# Patient Record
Sex: FEMALE | Race: OTHER | URBAN - METROPOLITAN AREA
[De-identification: names, ages, dates, MRNs, and addresses within clinical notes are randomized per-mention and may not be internally consistent; named-entity substitution may affect disease eponyms.]

---

## 2022-12-12 PROBLEM — N64.4 MASTALGIA: Status: ACTIVE | Noted: 2022-12-12

## 2022-12-12 NOTE — PROGRESS NOTES
Subjective:   Patient ID: Eliazbeth Jason is a 34year old female. Patient here for routine exam.  Reports having right mastitis for about two months with multiple treatments with antibiotics. Patient states this occurred after piercing from nipples removed. No drainage or erythema now but feels that there is discomfort with shooting pains in right breast.  Discussed that after a chronic infection there could be scar tissue under right nipple that will take time to heal.  Discussed going for Bilateral Breast ultrasound for Mastalgia. Patient expresses understanding and agrees. Patient also reports does not use contraception but desires to conceive. Has been with same partner for ten years. Partner has no children from any previous relationship. Patient reports regular menses and no H/O PID. Discussed that partner can check Semen Analysis with his PCP. This is a 30 minute visit and greater than 50% of the time was spent counseling the patient and/or coordinating care. History/Other:   Review of Systems   Constitutional: Negative. HENT: Negative. Respiratory: Negative. Cardiovascular: Negative. Gastrointestinal: Negative. Genitourinary: Negative. Neurological: Negative. Hematological: Negative. Psychiatric/Behavioral: Negative. No current outpatient medications on file. Allergies:No Known Allergies    Objective:   Physical Exam  Vitals and nursing note reviewed. Constitutional:       Appearance: Normal appearance. She is well-developed. She is obese. HENT:      Head: Normocephalic. Neck:      Thyroid: No thyromegaly. Cardiovascular:      Rate and Rhythm: Normal rate and regular rhythm. Heart sounds: Normal heart sounds. No murmur heard. Pulmonary:      Effort: Pulmonary effort is normal.      Breath sounds: Normal breath sounds. Comments: Breast Exam:  No masses. No nipple discharge. No adenopathy. No erythema.   Some scarring under right nipple. Abdominal:      General: Abdomen is flat. Bowel sounds are normal.      Palpations: Abdomen is soft. There is no mass. Tenderness: There is no abdominal tenderness. Genitourinary:     General: Normal vulva. Vagina: Normal. No vaginal discharge. Comments: Cervix:  No CMT. No lesions. Pap Done. Uterus:  Small, NT and AV. Adnexa:  No adnexal masses. NT. Musculoskeletal:         General: Normal range of motion. Cervical back: Normal range of motion and neck supple. Lymphadenopathy:      Cervical: No cervical adenopathy. Skin:     General: Skin is warm and dry. Neurological:      General: No focal deficit present. Mental Status: She is alert and oriented to person, place, and time. Psychiatric:         Mood and Affect: Mood normal.         Behavior: Behavior normal.         Thought Content: Thought content normal.         Judgment: Judgment normal.         Assessment & Plan:   Well woman exam with routine gynecological exam  (primary encounter diagnosis)  Mastalgia  Encounter for gynecological examination without abnormal finding   F/U Labs. Encouraged monthly SBE. Discussed diet and exercise.     Orders Placed This Encounter      ThinPrep PAP with HPV Reflex Request [E]      Meds This Visit:  Requested Prescriptions      No prescriptions requested or ordered in this encounter       Imaging & Referrals:  US BREAST BILATERAL COMPLETE (KQC=[id number])

## 2023-01-06 ENCOUNTER — HOSPITAL ENCOUNTER (OUTPATIENT)
Dept: ULTRASOUND IMAGING | Facility: HOSPITAL | Age: 33
Discharge: HOME | End: 2023-01-06
Attending: OBSTETRICS & GYNECOLOGY
Payer: COMMERCIAL

## 2023-01-06 ENCOUNTER — HOSPITAL ENCOUNTER (OUTPATIENT)
Dept: MAMMOGRAPHY | Facility: HOSPITAL | Age: 33
Discharge: HOME | End: 2023-01-06
Attending: OBSTETRICS & GYNECOLOGY
Payer: COMMERCIAL

## 2023-01-06 DIAGNOSIS — N64.4 BREAST PAIN: ICD-10-CM

## 2023-01-06 PROCEDURE — 77066 DX MAMMO INCL CAD BI: CPT | Performed by: OBSTETRICS & GYNECOLOGY

## 2023-01-06 PROCEDURE — 76642 ULTRASOUND BREAST LIMITED: CPT | Performed by: OBSTETRICS & GYNECOLOGY

## 2023-01-06 PROCEDURE — 77062 BREAST TOMOSYNTHESIS BI: CPT | Performed by: OBSTETRICS & GYNECOLOGY

## 2023-04-03 ENCOUNTER — OFFICE VISIT (OUTPATIENT)
Dept: OBGYN CLINIC | Facility: CLINIC | Age: 33
End: 2023-04-03

## 2023-04-03 VITALS
WEIGHT: 283 LBS | BODY MASS INDEX: 40.52 KG/M2 | DIASTOLIC BLOOD PRESSURE: 81 MMHG | SYSTOLIC BLOOD PRESSURE: 124 MMHG | HEIGHT: 70 IN

## 2023-04-03 DIAGNOSIS — N92.6 MISSED MENSES: Primary | ICD-10-CM

## 2023-04-03 PROBLEM — O99.211 OBESITY AFFECTING PREGNANCY IN FIRST TRIMESTER: Status: ACTIVE | Noted: 2023-04-03

## 2023-04-03 PROBLEM — O99.211 OBESITY AFFECTING PREGNANCY IN FIRST TRIMESTER (HCC): Status: ACTIVE | Noted: 2023-04-03

## 2023-04-03 LAB
CONTROL LINE PRESENT WITH A CLEAR BACKGROUND (YES/NO): YES YES/NO
PREGNANCY TEST, URINE: POSITIVE

## 2023-04-03 PROCEDURE — 81025 URINE PREGNANCY TEST: CPT | Performed by: OBSTETRICS & GYNECOLOGY

## 2023-04-03 PROCEDURE — 3074F SYST BP LT 130 MM HG: CPT | Performed by: OBSTETRICS & GYNECOLOGY

## 2023-04-03 PROCEDURE — 99213 OFFICE O/P EST LOW 20 MIN: CPT | Performed by: OBSTETRICS & GYNECOLOGY

## 2023-04-03 PROCEDURE — 3079F DIAST BP 80-89 MM HG: CPT | Performed by: OBSTETRICS & GYNECOLOGY

## 2023-04-03 PROCEDURE — 3008F BODY MASS INDEX DOCD: CPT | Performed by: OBSTETRICS & GYNECOLOGY

## 2023-04-03 NOTE — PROGRESS NOTES
Subjective:   Patient ID: Maria Fernanda Serna is a 34year old female. Patient here for PCV. Unsure dates. Will get Quant BHCG today and then schedule OB ultrasound x 2 weeks for Dates and viability. Menses have been irregular. Weight has been fluctuating. Discussed PNC and PNV. Optional and required screening reviewed. Miscarriage Precautions reviewed. All questions answered. This is a 20 minute visit and greater than 50% of the time was spent counseling the patient and/or coordinating care. History/Other:   Review of Systems   Constitutional: Negative. HENT: Negative. Respiratory: Negative. Cardiovascular: Negative. Gastrointestinal: Negative. Genitourinary: Negative. Neurological: Negative. Hematological: Negative. Psychiatric/Behavioral: Negative. No current outpatient medications on file. Allergies:No Known Allergies    Objective:   Physical Exam  Vitals and nursing note reviewed. Constitutional:       Appearance: Normal appearance. Skin:     General: Skin is warm and dry. Neurological:      General: No focal deficit present. Mental Status: She is alert and oriented to person, place, and time. Psychiatric:         Mood and Affect: Mood normal.         Behavior: Behavior normal.         Thought Content: Thought content normal.         Judgment: Judgment normal.         Assessment & Plan:   Missed menses  (primary encounter diagnosis)  All questions answered. F/U Labs and OB ultrasound. Miscarriage Precautions reviewed.    Orders Placed This Encounter      POC Urine pregnancy test [06122]      HCG, Beta Subunit, Qual      Meds This Visit:  Requested Prescriptions      No prescriptions requested or ordered in this encounter       Imaging & Referrals:  US PREG 1ST TRIMESTER (CPT=76801)

## 2023-04-04 ENCOUNTER — TELEPHONE (OUTPATIENT)
Dept: OBGYN CLINIC | Facility: CLINIC | Age: 33
End: 2023-04-04

## 2023-04-04 ENCOUNTER — LAB ENCOUNTER (OUTPATIENT)
Dept: LAB | Age: 33
End: 2023-04-04
Attending: OBSTETRICS & GYNECOLOGY
Payer: COMMERCIAL

## 2023-04-04 DIAGNOSIS — N92.6 MISSED MENSES: Primary | ICD-10-CM

## 2023-04-04 DIAGNOSIS — N92.6 MISSED MENSES: ICD-10-CM

## 2023-04-04 LAB
B-HCG SERPL-ACNC: 62 MIU/ML
HCG SERPL QL: POSITIVE

## 2023-04-04 PROCEDURE — 36415 COLL VENOUS BLD VENIPUNCTURE: CPT

## 2023-04-04 PROCEDURE — 84703 CHORIONIC GONADOTROPIN ASSAY: CPT

## 2023-04-04 PROCEDURE — 84702 CHORIONIC GONADOTROPIN TEST: CPT

## 2023-04-04 NOTE — TELEPHONE ENCOUNTER
Per result note, ML requesting Quant HCG instead of Qualitative HCG, lab was drawn today 4/4/23. Lab called and confirmed that blood in lab can be used for a Quantitative BHCG test. Order placed.

## 2023-04-05 DIAGNOSIS — N92.6 MISSED MENSES: Primary | ICD-10-CM

## 2023-04-06 ENCOUNTER — TELEPHONE (OUTPATIENT)
Dept: OBGYN CLINIC | Facility: CLINIC | Age: 33
End: 2023-04-06

## 2023-04-06 ENCOUNTER — LAB ENCOUNTER (OUTPATIENT)
Dept: LAB | Age: 33
End: 2023-04-06
Attending: FAMILY MEDICINE
Payer: COMMERCIAL

## 2023-04-06 DIAGNOSIS — N92.6 MISSED MENSES: ICD-10-CM

## 2023-04-06 DIAGNOSIS — O03.9 SAB (SPONTANEOUS ABORTION): Primary | ICD-10-CM

## 2023-04-06 LAB — B-HCG SERPL-ACNC: 58 MIU/ML

## 2023-04-06 PROCEDURE — 84702 CHORIONIC GONADOTROPIN TEST: CPT

## 2023-04-06 PROCEDURE — 36415 COLL VENOUS BLD VENIPUNCTURE: CPT

## 2023-04-13 ENCOUNTER — LAB ENCOUNTER (OUTPATIENT)
Dept: LAB | Facility: REFERENCE LAB | Age: 33
End: 2023-04-13
Attending: OBSTETRICS & GYNECOLOGY
Payer: COMMERCIAL

## 2023-04-13 ENCOUNTER — TELEPHONE (OUTPATIENT)
Dept: OBGYN CLINIC | Facility: CLINIC | Age: 33
End: 2023-04-13

## 2023-04-13 DIAGNOSIS — O03.9 SAB (SPONTANEOUS ABORTION): ICD-10-CM

## 2023-04-13 LAB
B-HCG SERPL-ACNC: 46 MIU/ML
RH BLOOD TYPE: POSITIVE

## 2023-04-13 PROCEDURE — 84702 CHORIONIC GONADOTROPIN TEST: CPT

## 2023-04-13 PROCEDURE — 86901 BLOOD TYPING SEROLOGIC RH(D): CPT

## 2023-04-13 PROCEDURE — 86900 BLOOD TYPING SEROLOGIC ABO: CPT

## 2023-04-13 PROCEDURE — 36415 COLL VENOUS BLD VENIPUNCTURE: CPT

## 2023-04-21 ENCOUNTER — HOSPITAL ENCOUNTER (OUTPATIENT)
Dept: ULTRASOUND IMAGING | Age: 33
Discharge: HOME | End: 2023-04-21
Attending: OBSTETRICS & GYNECOLOGY
Payer: COMMERCIAL

## 2023-04-21 DIAGNOSIS — N92.6 MISSED MENSES: ICD-10-CM

## 2023-04-21 PROCEDURE — 76801 OB US < 14 WKS SINGLE FETUS: CPT | Performed by: OBSTETRICS & GYNECOLOGY

## 2023-04-21 PROCEDURE — 76817 TRANSVAGINAL US OBSTETRIC: CPT | Performed by: OBSTETRICS & GYNECOLOGY

## 2024-08-09 ENCOUNTER — TELEPHONE (OUTPATIENT)
Dept: OBGYN CLINIC | Facility: CLINIC | Age: 34
End: 2024-08-09

## 2024-08-15 ENCOUNTER — OFFICE VISIT (OUTPATIENT)
Dept: OBGYN CLINIC | Facility: CLINIC | Age: 34
End: 2024-08-15
Payer: COMMERCIAL

## 2024-08-15 VITALS
HEIGHT: 70 IN | SYSTOLIC BLOOD PRESSURE: 128 MMHG | BODY MASS INDEX: 41.54 KG/M2 | DIASTOLIC BLOOD PRESSURE: 72 MMHG | WEIGHT: 290.19 LBS

## 2024-08-15 DIAGNOSIS — E28.2 PCOS (POLYCYSTIC OVARIAN SYNDROME): ICD-10-CM

## 2024-08-15 DIAGNOSIS — Z01.419 ENCOUNTER FOR ANNUAL ROUTINE GYNECOLOGICAL EXAMINATION: Primary | ICD-10-CM

## 2024-08-15 DIAGNOSIS — N64.4 MASTALGIA: ICD-10-CM

## 2024-08-15 PROCEDURE — 3074F SYST BP LT 130 MM HG: CPT | Performed by: OBSTETRICS & GYNECOLOGY

## 2024-08-15 PROCEDURE — 87491 CHLMYD TRACH DNA AMP PROBE: CPT | Performed by: OBSTETRICS & GYNECOLOGY

## 2024-08-15 PROCEDURE — 3078F DIAST BP <80 MM HG: CPT | Performed by: OBSTETRICS & GYNECOLOGY

## 2024-08-15 PROCEDURE — 87591 N.GONORRHOEAE DNA AMP PROB: CPT | Performed by: OBSTETRICS & GYNECOLOGY

## 2024-08-15 PROCEDURE — 99385 PREV VISIT NEW AGE 18-39: CPT | Performed by: OBSTETRICS & GYNECOLOGY

## 2024-08-15 PROCEDURE — 3008F BODY MASS INDEX DOCD: CPT | Performed by: OBSTETRICS & GYNECOLOGY

## 2024-08-15 PROCEDURE — 99215 OFFICE O/P EST HI 40 MIN: CPT | Performed by: OBSTETRICS & GYNECOLOGY

## 2024-08-15 NOTE — H&P
[facility]  Obstetrics and Gynecology  History & Physical    CC:   Chief Complaint   Patient presents with    Annual    Consult     Pts states she also has breast pain.   PCOS symptoms.         Subjective:     HPI: Esme Hood is a 34 year old  female here for a well women exam and the above CC. Patient reports having her nipples pierced a few years ago. Started having pain in her nipples and removed the piercing's. Noted the right breast nipple has been persistently painful. Fluctuates monthly and can be in the middle and end of her cycle. Noted she also has a painful lesion in the inferior portion of her right breast that can bleed. Not currently bleeding from this area.     Menses have been regular for about the last year. Lasting less than 8 days and not heavy bleeding. Prior to this was consistently irregular. Noted she has facial hair requiring weekly waxing. Has not been using contraception for years and has only become pregnant once which was a chemical pregnancy.     OB History:  OB History    Para Term  AB Living   0 0 0 0 0 0   SAB IAB Ectopic Multiple Live Births   0 0 0 0 0       Gyne History:  Hx Prior Abnormal Pap: Yes  Pap Date: 22  Pap Result Notes: [facility]  Patient's last menstrual period was 2024 (exact date).  Last pap 2022 NILM HPV negative.   Sexual history: Active? Yes    Meds:  No current outpatient medications on file prior to visit.     No current facility-administered medications on file prior to visit.       All:  No Known Allergies    PMH:  Past Medical History:    Patient denies medical problems       Immunization History:     There is no immunization history on file for this patient.    PSH:  Past Surgical History:   Procedure Laterality Date    Patient denies any surgical history         Social History:  Social History     Socioeconomic History    Marital status: Single     Spouse name: Not on file    Number of  children: Not on file    Years of education: Not on file    Highest education level: Not on file   Occupational History    Not on file   Tobacco Use    Smoking status: Never    Smokeless tobacco: Never   Substance and Sexual Activity    Alcohol use: Yes     Comment: socially    Drug use: Never    Sexual activity: Yes     Partners: Male   Other Topics Concern     Service Not Asked    Blood Transfusions No    Caffeine Concern Not Asked    Occupational Exposure Not Asked    Hobby Hazards Not Asked    Sleep Concern Not Asked    Stress Concern Not Asked    Weight Concern Not Asked    Special Diet Not Asked    Back Care Not Asked    Exercise Not Asked    Bike Helmet Not Asked    Seat Belt Not Asked    Self-Exams Not Asked   Social History Narrative    Not on file     Social Determinants of Health     Financial Resource Strain: Not on file   Food Insecurity: Not on file   Transportation Needs: Not on file   Physical Activity: Not on file   Stress: Not on file   Social Connections: Not on file   Housing Stability: Low Risk  (10/14/2022)    [facility]    Housing Stability     Mortgage Payment Concerns?: Not on file     Number of Places Lived in the Last Year: Not on file     Unstable Housing?: Not on file       Family History:  Family History   Problem Relation Age of Onset    Breast Cancer Maternal Aunt         age unknown       Health maintenance:  Mammogram: Normal 01/2023.   Colonoscopy: Due at 44.     Review of Systems:  General: no complaints  Eyes: no complaints  Respiratory: no complaints  Cardiovascular: no complaints  GI: no complaints  : See HPI  Hematological/lymphatic: no complaints  Breast: no complaints  Psychiatric: no complaints  Endocrine:no complaints  Neurological: no complaints  Immunological: no complaints  Musculoskeletal:no complaints      Objective:     Vitals:    08/15/24 1151   BP: 128/72   Weight: 290 lb 3.2 oz (131.6 kg)    Height: 70\"         Body mass index is 41.64 kg/m².    Exam with MAYDA Harrell present:   GENERAL: well developed, well nourished, in no apparent distress, alert and orientated X 3  PSYCH: mood and affect stable   SKIN: no rashes, no lesions  LUNGS: respiration unlabored  CARDIOVASCULAR: no peripheral edema or varicosities, skin warm and dry  BREASTS: bilaterally nontender, no palpable masses, no nipple discharge, no skin changes in the left breast, In the inferior medial portion of the right breast there is a chronic skin lesion measuring about 1 cm in length by about 4 mm in width, no axillary adenopathy bilaterally  ABDOMEN: Soft, non distended; non tender, no masses  GYNE/:   External Genitalia: normal, no lesions, good perineal support  Urethra: meatus normal   Bladder: well supported  Vagina: normal mucosa, no lesions, normal discharge   Cervix: normal os, no lesions or bleeding  Cul-de-sac: normal  R/V: normal perineum.   EXTREMITIES:  Normal range of motion, strength 5/5 walking.    Labs:  Reviewed last pap.     Imaging:  Reviewed mammogram 2023.     Assessment:     Esme Hood is a 34 year old  female here for a well women exam, Mastalgia, and PCOS symptoms.     Patient Active Problem List   Diagnosis    Mastalgia    Obesity affecting pregnancy in first trimester (HCC)    PCOS (polycystic ovarian syndrome)         Plan:   1. Encounter for annual routine gynecological examination  - Normal breast exam except for skin lesion.   - Recommend daily topical triple antibiotic lotion to the skin lesion.   - No contraception needs.   - Desires STI screening. Ordered and collected on exam.   - HIV Ag/Ab Combo; Future  - T Pallidum Screening Cascade; Future  - Chlamydia/Gc Amplification; Future  - Vaginitis Vaginosis PCR Panel; Future  - Chlamydia/Gc Amplification  - Vaginitis Vaginosis PCR Panel    2. Mastalgia  - Reviewed her chronic history of right breast pain. Right  breast lesion is also present and Recommended daily Triple Antibiotic lotion to this area.   - Recommend daily evening primrose oil, limitation of caffeine. If not helping and mammogram normal will recommend follow up to discuss use of tamoxifen.   - Follow up with breast surgery after imaging.   - [facility] ELMER 2D+3D SCREENING BILAT (CPT=77067/23503); Future  - Surgical Breast Oncology Referral - [place]    3. PCOS (polycystic ovarian syndrome)  - Recommend checking ultrasound pelvis and day 3 labs. Patient understood to have collected on day 3 of menses.   - Has regular menses. If not then recommended use of birth control for cycle control ie endometrial protection. Reviewed risks of endometrial precancer and cancer if irregular.   - Estradiol; Future  - LH (Luteinizing Hormone); Future  - FSH; Future  - TSH and Free T4; Future  - Hemoglobin A1C; Future  - Dehydroepiandrosterone Sulfate; Future  - Pelvic US Complete GYNE Only [33816/27335]; Future  - Vaginitis Vaginosis PCR Panel; Future  - Vaginitis Vaginosis PCR Panel     All of the findings and plan were discussed with the patient.  She notes understanding and agrees with the plan of care.  All questions were answered to the best of my ability at this time.      RTC in 1 year for a well woman exam or sooner if needed     Dr. Loc River MD    [facility]     This note was created by [employer] voice recognition. Errors in content may be related to improper recognition by the system; efforts to review and correct have been done but errors may still exist. Please be advised the primary purpose of this note is for me to communicate medical care. Standard sentence structure is not always used. Medical terminology and medical abbreviations may be used. There may be grammatical, typographical, and automated fill ins that may have errors missed in proofreading.        Total patient time was 60 minutes in evaluation and management outside of routine annual care.

## 2024-08-16 ENCOUNTER — TELEPHONE (OUTPATIENT)
Dept: OBGYN CLINIC | Facility: CLINIC | Age: 34
End: 2024-08-16

## 2024-08-16 LAB
C TRACH DNA SPEC QL NAA+PROBE: NEGATIVE
N GONORRHOEA DNA SPEC QL NAA+PROBE: NEGATIVE

## 2024-08-16 NOTE — TELEPHONE ENCOUNTER
Called reference lab and the wrong sponge was used for the tube.  The tube should be green for vaginosis screen.    Called pt and scheduled for reswab at [facility], tomorrow.  Pt agrees.

## 2024-08-17 ENCOUNTER — OFFICE VISIT (OUTPATIENT)
Dept: OBGYN CLINIC | Facility: CLINIC | Age: 34
End: 2024-08-17
Payer: COMMERCIAL

## 2024-08-17 VITALS
HEIGHT: 70 IN | DIASTOLIC BLOOD PRESSURE: 72 MMHG | BODY MASS INDEX: 41.32 KG/M2 | SYSTOLIC BLOOD PRESSURE: 102 MMHG | WEIGHT: 288.63 LBS

## 2024-08-17 DIAGNOSIS — Z11.3 ROUTINE SCREENING FOR STI (SEXUALLY TRANSMITTED INFECTION): Primary | ICD-10-CM

## 2024-08-17 PROCEDURE — 81514 NFCT DS BV&VAGINITIS DNA ALG: CPT | Performed by: OBSTETRICS & GYNECOLOGY

## 2024-08-17 PROCEDURE — 3008F BODY MASS INDEX DOCD: CPT | Performed by: OBSTETRICS & GYNECOLOGY

## 2024-08-17 PROCEDURE — 3078F DIAST BP <80 MM HG: CPT | Performed by: OBSTETRICS & GYNECOLOGY

## 2024-08-17 PROCEDURE — 3074F SYST BP LT 130 MM HG: CPT | Performed by: OBSTETRICS & GYNECOLOGY

## 2024-08-17 NOTE — PROGRESS NOTES
[facility]  Obstetrics and Gynecology   Follow Up    Subjective:     Esme Hood is a 34 year old  female who presents with c/o   Chief Complaint   Patient presents with    Other     Reswab        The patient reports no new complaints. Here for repeat swab due to incorrect swab being used for vaginitis test.    Objective:     Vitals:    24 0920   BP: 102/72   Weight: 288 lb 9.6 oz (130.9 kg)   Height: 70\"         Body mass index is 41.41 kg/m².    Exam with MAYDA Holt present:   GENERAL: well developed, well nourished, in no apparent distress, alert and orientated X 3  PSYCH: mood and affect stable   SKIN: no rashes, no lesions  LUNGS: respiration unlabored  CARDIOVASCULAR: no peripheral edema or varicosities, skin warm and dry  GYNE/:   External Genitalia: normal, no lesions, good perineal support  EXTREMITIES:  Normal range of motion, strength 5/5 walking.        Assessment:     Esme Hood is a 34 year old  female who presents for repeat swab for routine STI screening.     Patient Active Problem List   Diagnosis    Mastalgia    Obesity affecting pregnancy in first trimester (HCC)    PCOS (polycystic ovarian syndrome)         Plan:     1. Routine screening for STI (sexually transmitted infection)  - Vaginitis Vaginosis PCR Panel; Future     All of the findings and plan were discussed with the patient.  She notes understanding and agrees with the plan of care.  All questions were answered to the best of my ability at this time.    RTC in 1 year for a well woman exam or sooner if needed      Dr. Loc River MD    [facility]      This note was created by [employer] voice recognition. Errors in content may be related to improper recognition by the system; efforts to review and correct have been done but errors may still exist. Please be advised the primary purpose of this note is for me to communicate medical care.  Standard sentence structure is not always used. Medical terminology and medical abbreviations may be used. There may be grammatical, typographical, and automated fill ins that may have errors missed in proofreading.      No LOS due to only repeat swab collection.

## 2024-08-18 LAB
BV BACTERIA DNA VAG QL NAA+PROBE: NEGATIVE
C GLABRATA DNA VAG QL NAA+PROBE: NEGATIVE
C KRUSEI DNA VAG QL NAA+PROBE: NEGATIVE
CANDIDA DNA VAG QL NAA+PROBE: NEGATIVE
T VAGINALIS DNA VAG QL NAA+PROBE: NEGATIVE

## 2024-08-22 ENCOUNTER — ULTRASOUND ENCOUNTER (OUTPATIENT)
Dept: OBGYN CLINIC | Facility: CLINIC | Age: 34
End: 2024-08-22
Payer: COMMERCIAL

## 2024-08-22 ENCOUNTER — OFFICE VISIT (OUTPATIENT)
Dept: FAMILY MEDICINE CLINIC | Facility: CLINIC | Age: 34
End: 2024-08-22
Payer: COMMERCIAL

## 2024-08-22 VITALS
WEIGHT: 293 LBS | SYSTOLIC BLOOD PRESSURE: 131 MMHG | HEIGHT: 70 IN | DIASTOLIC BLOOD PRESSURE: 85 MMHG | HEART RATE: 77 BPM | BODY MASS INDEX: 41.95 KG/M2

## 2024-08-22 DIAGNOSIS — L98.8 SKIN LESION OF BREAST: ICD-10-CM

## 2024-08-22 DIAGNOSIS — E28.2 PCOS (POLYCYSTIC OVARIAN SYNDROME): ICD-10-CM

## 2024-08-22 DIAGNOSIS — N64.4 BREAST PAIN: Primary | ICD-10-CM

## 2024-08-22 DIAGNOSIS — E66.01 CLASS 3 SEVERE OBESITY WITHOUT SERIOUS COMORBIDITY WITH BODY MASS INDEX (BMI) OF 40.0 TO 44.9 IN ADULT, UNSPECIFIED OBESITY TYPE (HCC): ICD-10-CM

## 2024-08-22 NOTE — PROGRESS NOTES
HPI: Esme is a 34 year old female who presents for breast pain.  Had nipples pierced in April of 2021. Took them out in June of 2022 due to infection.  Had infection after that in 9/22.  Had 2-3 rounds of antibiotics but continued to have right breast pain. Still has tenderness on right side and it does not feel normal. Needs to schedule mammogram.  Already had one abnormal. Pain is bothersome.  Hurts to lay on her stomach.     Has sore to mid, lower chest. Was told it was secondary to wearing a wire bra.  Opens 1-2 times per month and is painful. Has not closed. Putting Aquaphor which helped. Seems to be related to period.     Pt has been chronically overweight.  Has facial hair.  Questions if she has PCOS. Had irregular periods in the past.  Now has normal periods. Spoke with Gyne regarding this and has US ordered and needs labs on Day 3 of cycle.     PMH:    Past Medical History:    Patient denies medical problems      Alg:  Patient has no known allergies.   Meds:   No current outpatient medications on file prior to visit.     No current facility-administered medications on file prior to visit.      Tobacco Use: no    ROS: see HPI    Objective:   Gen: AOx3. NAD.  /85 (BP Location: Left arm, Patient Position: Sitting, Cuff Size: large)   Pulse 77   Ht 5' 10\" (1.778 m)   Wt 295 lb (133.8 kg)   LMP 07/19/2024 (Exact Date)   BMI 42.33 kg/m²   Breasts: left breast- normal exam  Right breast- bean-sized, slightly excoriated sore noted to mid lower breast    Assessment:/Plan:  Encounter Diagnoses   Name Primary?    Breast pain    Will schedule mammogram   Yes    Skin lesion of breast    ?hidradenitis.  Sore opens and then heals with periods.  Will refer to breast surgeon.        Class 3 severe obesity without serious comorbidity with body mass index (BMI) of 40.0 to 44.9 in adult, unspecified obesity type (HCC)    Looks like she does have a diagnosis of PCOS.  Advised to get labs as asked.  Follow with  Gynecology.       Colleen Weiler, DO

## 2024-08-31 ENCOUNTER — HOSPITAL ENCOUNTER (OUTPATIENT)
Dept: MAMMOGRAPHY | Facility: HOSPITAL | Age: 34
Discharge: HOME | End: 2024-08-31
Attending: OBSTETRICS & GYNECOLOGY
Payer: COMMERCIAL

## 2024-08-31 DIAGNOSIS — N64.4 MASTALGIA: ICD-10-CM

## 2024-08-31 PROCEDURE — 77063 BREAST TOMOSYNTHESIS BI: CPT | Performed by: OBSTETRICS & GYNECOLOGY

## 2024-08-31 PROCEDURE — 77067 SCR MAMMO BI INCL CAD: CPT | Performed by: OBSTETRICS & GYNECOLOGY

## 2024-09-16 ENCOUNTER — TELEPHONE (OUTPATIENT)
Dept: OBGYN CLINIC | Facility: CLINIC | Age: 34
End: 2024-09-16

## 2024-09-16 NOTE — TELEPHONE ENCOUNTER
[facility]   
Hide Additional Notes?: No
Detail Level: Detailed

## 2024-09-17 ENCOUNTER — LAB ENCOUNTER (OUTPATIENT)
Dept: LAB | Facility: REFERENCE LAB | Age: 34
End: 2024-09-17
Attending: OBSTETRICS & GYNECOLOGY
Payer: COMMERCIAL

## 2024-09-17 DIAGNOSIS — E28.2 PCOS (POLYCYSTIC OVARIAN SYNDROME): ICD-10-CM

## 2024-09-17 DIAGNOSIS — Z01.419 ENCOUNTER FOR ANNUAL ROUTINE GYNECOLOGICAL EXAMINATION: ICD-10-CM

## 2024-09-17 LAB
DHEA-S SERPL-MCNC: 84.9 UG/DL
EST. AVERAGE GLUCOSE BLD GHB EST-MCNC: 114 MG/DL (ref 68–126)
ESTRADIOL SERPL-MCNC: 19.9 PG/ML
FSH SERPL-ACNC: 7.9 MIU/ML
HBA1C MFR BLD: 5.6 % (ref ?–5.7)
LH SERPL-ACNC: 4.6 MIU/ML
T PALLIDUM AB SER QL IA: NONREACTIVE
T4 FREE SERPL-MCNC: 1.1 NG/DL (ref 0.8–1.7)
TSI SER-ACNC: 1.51 MIU/ML (ref 0.55–4.78)

## 2024-09-17 PROCEDURE — 83001 ASSAY OF GONADOTROPIN (FSH): CPT | Performed by: OBSTETRICS & GYNECOLOGY

## 2024-09-17 PROCEDURE — 87389 HIV-1 AG W/HIV-1&-2 AB AG IA: CPT | Performed by: OBSTETRICS & GYNECOLOGY

## 2024-09-17 PROCEDURE — 86780 TREPONEMA PALLIDUM: CPT | Performed by: OBSTETRICS & GYNECOLOGY

## 2024-09-17 PROCEDURE — 82670 ASSAY OF TOTAL ESTRADIOL: CPT | Performed by: OBSTETRICS & GYNECOLOGY

## 2024-09-17 PROCEDURE — 82627 DEHYDROEPIANDROSTERONE: CPT | Performed by: OBSTETRICS & GYNECOLOGY

## 2024-09-17 PROCEDURE — 83036 HEMOGLOBIN GLYCOSYLATED A1C: CPT | Performed by: OBSTETRICS & GYNECOLOGY

## 2024-09-17 PROCEDURE — 84443 ASSAY THYROID STIM HORMONE: CPT | Performed by: OBSTETRICS & GYNECOLOGY

## 2024-09-17 PROCEDURE — 83002 ASSAY OF GONADOTROPIN (LH): CPT | Performed by: OBSTETRICS & GYNECOLOGY

## 2024-09-17 PROCEDURE — 84439 ASSAY OF FREE THYROXINE: CPT | Performed by: OBSTETRICS & GYNECOLOGY

## 2024-09-25 PROBLEM — E66.01 CLASS 3 SEVERE OBESITY WITHOUT SERIOUS COMORBIDITY IN ADULT (HCC): Chronic | Status: ACTIVE | Noted: 2024-09-25

## 2024-09-25 PROBLEM — E66.813 CLASS 3 SEVERE OBESITY WITHOUT SERIOUS COMORBIDITY IN ADULT (HCC): Chronic | Status: ACTIVE | Noted: 2024-09-25

## 2024-09-25 PROBLEM — E66.813 CLASS 3 SEVERE OBESITY WITHOUT SERIOUS COMORBIDITY IN ADULT: Chronic | Status: ACTIVE | Noted: 2024-09-25

## 2025-08-21 ENCOUNTER — OFFICE VISIT (OUTPATIENT)
Dept: OBGYN CLINIC | Facility: CLINIC | Age: 35
End: 2025-08-21

## 2025-08-21 VITALS
SYSTOLIC BLOOD PRESSURE: 110 MMHG | WEIGHT: 293 LBS | DIASTOLIC BLOOD PRESSURE: 78 MMHG | HEIGHT: 70 IN | BODY MASS INDEX: 41.95 KG/M2

## 2025-08-21 DIAGNOSIS — N64.4 BREAST PAIN, RIGHT: Primary | ICD-10-CM

## 2025-08-21 DIAGNOSIS — R22.32 LEFT AXILLARY FULLNESS: ICD-10-CM

## 2025-08-21 DIAGNOSIS — Z31.69 INFERTILITY COUNSELING: ICD-10-CM

## 2025-08-21 DIAGNOSIS — R07.89 MID STERNAL CHEST PAIN: ICD-10-CM

## 2025-08-21 PROCEDURE — 3078F DIAST BP <80 MM HG: CPT | Performed by: OBSTETRICS & GYNECOLOGY

## 2025-08-21 PROCEDURE — 3074F SYST BP LT 130 MM HG: CPT | Performed by: OBSTETRICS & GYNECOLOGY

## 2025-08-21 PROCEDURE — 3008F BODY MASS INDEX DOCD: CPT | Performed by: OBSTETRICS & GYNECOLOGY

## 2025-08-21 PROCEDURE — 99214 OFFICE O/P EST MOD 30 MIN: CPT | Performed by: OBSTETRICS & GYNECOLOGY
